# Patient Record
Sex: MALE | Race: WHITE | Employment: UNEMPLOYED | URBAN - METROPOLITAN AREA
[De-identification: names, ages, dates, MRNs, and addresses within clinical notes are randomized per-mention and may not be internally consistent; named-entity substitution may affect disease eponyms.]

---

## 2020-07-16 ENCOUNTER — APPOINTMENT (OUTPATIENT)
Dept: CT IMAGING | Age: 25
End: 2020-07-16
Attending: EMERGENCY MEDICINE
Payer: COMMERCIAL

## 2020-07-16 ENCOUNTER — HOSPITAL ENCOUNTER (EMERGENCY)
Age: 25
Discharge: HOME | End: 2020-07-16
Attending: EMERGENCY MEDICINE
Payer: COMMERCIAL

## 2020-07-16 ENCOUNTER — APPOINTMENT (OUTPATIENT)
Dept: GENERAL RADIOLOGY | Age: 25
End: 2020-07-16
Attending: EMERGENCY MEDICINE
Payer: COMMERCIAL

## 2020-07-16 VITALS
HEIGHT: 73 IN | WEIGHT: 220 LBS | DIASTOLIC BLOOD PRESSURE: 70 MMHG | BODY MASS INDEX: 29.16 KG/M2 | RESPIRATION RATE: 18 BRPM | TEMPERATURE: 97.9 F | HEART RATE: 86 BPM | SYSTOLIC BLOOD PRESSURE: 128 MMHG | OXYGEN SATURATION: 97 %

## 2020-07-16 DIAGNOSIS — Z72.89 ENGAGES IN VAPING: ICD-10-CM

## 2020-07-16 DIAGNOSIS — R55 SYNCOPE AND COLLAPSE: Primary | ICD-10-CM

## 2020-07-16 LAB
ALBUMIN SERPL-MCNC: 4.2 G/DL (ref 3.5–5)
ALBUMIN/GLOB SERPL: 1.4 {RATIO} (ref 1.1–2.2)
ALP SERPL-CCNC: 60 U/L (ref 45–117)
ALT SERPL-CCNC: 35 U/L (ref 12–78)
AMPHET UR QL SCN: POSITIVE
ANION GAP SERPL CALC-SCNC: 9 MMOL/L (ref 5–15)
APPEARANCE UR: CLEAR
AST SERPL-CCNC: 33 U/L (ref 15–37)
BARBITURATES UR QL SCN: NEGATIVE
BASOPHILS # BLD: 0 K/UL (ref 0–0.1)
BASOPHILS NFR BLD: 1 % (ref 0–1)
BENZODIAZ UR QL: NEGATIVE
BILIRUB SERPL-MCNC: 0.3 MG/DL (ref 0.2–1)
BILIRUB UR QL: NEGATIVE
BUN SERPL-MCNC: 19 MG/DL (ref 6–20)
BUN/CREAT SERPL: 15 (ref 12–20)
CALCIUM SERPL-MCNC: 8.7 MG/DL (ref 8.5–10.1)
CANNABINOIDS UR QL SCN: NEGATIVE
CHLORIDE SERPL-SCNC: 104 MMOL/L (ref 97–108)
CK SERPL-CCNC: 645 U/L (ref 39–308)
CO2 SERPL-SCNC: 27 MMOL/L (ref 21–32)
COCAINE UR QL SCN: NEGATIVE
COLOR UR: NORMAL
CREAT SERPL-MCNC: 1.28 MG/DL (ref 0.7–1.3)
DIFFERENTIAL METHOD BLD: NORMAL
DRUG SCRN COMMENT,DRGCM: ABNORMAL
EOSINOPHIL # BLD: 0.1 K/UL (ref 0–0.4)
EOSINOPHIL NFR BLD: 1 % (ref 0–7)
ERYTHROCYTE [DISTWIDTH] IN BLOOD BY AUTOMATED COUNT: 12.1 % (ref 11.5–14.5)
ETHANOL SERPL-MCNC: <10 MG/DL
GLOBULIN SER CALC-MCNC: 3.1 G/DL (ref 2–4)
GLUCOSE SERPL-MCNC: 91 MG/DL (ref 65–100)
GLUCOSE UR STRIP.AUTO-MCNC: NEGATIVE MG/DL
HCT VFR BLD AUTO: 42.8 % (ref 36.6–50.3)
HGB BLD-MCNC: 14 G/DL (ref 12.1–17)
HGB UR QL STRIP: NEGATIVE
IMM GRANULOCYTES # BLD AUTO: 0 K/UL (ref 0–0.04)
IMM GRANULOCYTES NFR BLD AUTO: 0 % (ref 0–0.5)
KETONES UR QL STRIP.AUTO: NEGATIVE MG/DL
LACTATE SERPL-SCNC: 0.7 MMOL/L (ref 0.4–2)
LEUKOCYTE ESTERASE UR QL STRIP.AUTO: NEGATIVE
LIPASE SERPL-CCNC: 73 U/L (ref 73–393)
LYMPHOCYTES # BLD: 3.5 K/UL (ref 0.8–3.5)
LYMPHOCYTES NFR BLD: 44 % (ref 12–49)
MCH RBC QN AUTO: 29.6 PG (ref 26–34)
MCHC RBC AUTO-ENTMCNC: 32.7 G/DL (ref 30–36.5)
MCV RBC AUTO: 90.5 FL (ref 80–99)
METHADONE UR QL: NEGATIVE
MONOCYTES # BLD: 0.6 K/UL (ref 0–1)
MONOCYTES NFR BLD: 7 % (ref 5–13)
NEUTS SEG # BLD: 3.8 K/UL (ref 1.8–8)
NEUTS SEG NFR BLD: 47 % (ref 32–75)
NITRITE UR QL STRIP.AUTO: NEGATIVE
NRBC # BLD: 0 K/UL (ref 0–0.01)
NRBC BLD-RTO: 0 PER 100 WBC
OPIATES UR QL: NEGATIVE
PCP UR QL: NEGATIVE
PH UR STRIP: 6.5 [PH] (ref 5–8)
PLATELET # BLD AUTO: 238 K/UL (ref 150–400)
PMV BLD AUTO: 10.7 FL (ref 8.9–12.9)
POTASSIUM SERPL-SCNC: 4.1 MMOL/L (ref 3.5–5.1)
PROT SERPL-MCNC: 7.3 G/DL (ref 6.4–8.2)
PROT UR STRIP-MCNC: NEGATIVE MG/DL
RBC # BLD AUTO: 4.73 M/UL (ref 4.1–5.7)
SODIUM SERPL-SCNC: 140 MMOL/L (ref 136–145)
SP GR UR REFRACTOMETRY: 1.02 (ref 1–1.03)
TROPONIN I SERPL-MCNC: <0.05 NG/ML
UROBILINOGEN UR QL STRIP.AUTO: 0.2 EU/DL (ref 0.2–1)
WBC # BLD AUTO: 8 K/UL (ref 4.1–11.1)

## 2020-07-16 PROCEDURE — 83605 ASSAY OF LACTIC ACID: CPT

## 2020-07-16 PROCEDURE — 93005 ELECTROCARDIOGRAM TRACING: CPT

## 2020-07-16 PROCEDURE — 70450 CT HEAD/BRAIN W/O DYE: CPT

## 2020-07-16 PROCEDURE — 80307 DRUG TEST PRSMV CHEM ANLYZR: CPT

## 2020-07-16 PROCEDURE — 84484 ASSAY OF TROPONIN QUANT: CPT

## 2020-07-16 PROCEDURE — 99285 EMERGENCY DEPT VISIT HI MDM: CPT

## 2020-07-16 PROCEDURE — 85025 COMPLETE CBC W/AUTO DIFF WBC: CPT

## 2020-07-16 PROCEDURE — 36415 COLL VENOUS BLD VENIPUNCTURE: CPT

## 2020-07-16 PROCEDURE — 80053 COMPREHEN METABOLIC PANEL: CPT

## 2020-07-16 PROCEDURE — 83690 ASSAY OF LIPASE: CPT

## 2020-07-16 PROCEDURE — 71046 X-RAY EXAM CHEST 2 VIEWS: CPT

## 2020-07-16 PROCEDURE — 81003 URINALYSIS AUTO W/O SCOPE: CPT

## 2020-07-16 PROCEDURE — 82550 ASSAY OF CK (CPK): CPT

## 2020-07-16 RX ORDER — DEXTROAMPHETAMINE SACCHARATE, AMPHETAMINE ASPARTATE, DEXTROAMPHETAMINE SULFATE AND AMPHETAMINE SULFATE 5; 5; 5; 5 MG/1; MG/1; MG/1; MG/1
20 TABLET ORAL 2 TIMES DAILY
COMMUNITY

## 2020-07-16 NOTE — ED NOTES
The patient was discharged home by Dr. Regis Bennett and Norma Hodge rnin stable condition, accompanied by mother. The patient is alert and oriented, is in no respiratory distress and has vital signs within normal limits . The patient's diagnosis, condition and treatment were explained to patient. The patient expressed understanding. No prescriptions given to pt. No work/school note given to pt. A discharge plan has been developed. A  was not involved in the process. Aftercare instructions were given to the patient. pt's saline lock removed without complications. Mother will transport pt home.

## 2020-07-16 NOTE — DISCHARGE INSTRUCTIONS
Patient Education     Fainting: After Your Visit to the Emergency Room  Your Care Instructions  You were seen in the emergency room because you fainted. This means you passed out, or lost consciousness. The treatment you need depends on the reason why you fainted. In many cases fainting is not serious. The doctor may have ordered tests to rule out more serious causes of fainting. Even though you have been released from the emergency room, you still need to watch for any problems. The doctor carefully checked you. But sometimes problems can develop later. If you have new symptoms, or if your symptoms do not get better, return to the emergency room or call your doctor right away. A visit to the emergency room is only one step in your treatment. Even if you feel better, you still need to do what your doctor recommends, such as going to all suggested follow-up appointments and taking medicines exactly as directed. This will help you recover and help prevent future problems. How can you care for yourself at home? · Drink plenty of fluids to prevent dehydration. If you have kidney, heart, or liver disease and have to limit fluids, talk with your doctor before you increase your fluid intake. When should you call for help? Call 911 if:  · You have chest pain or pressure. This may occur with:  ¨ Sweating. ¨ Shortness of breath. ¨ Nausea or vomiting. ¨ Pain that spreads from the chest to the neck, jaw, or one or both shoulders or arms. ¨ Dizziness or lightheadedness. ¨ A fast or uneven pulse. After calling 911, chew 1 adult-strength aspirin. Wait for an ambulance. Do not try to drive yourself. · You have signs of a stroke. These may include:  ¨ Sudden numbness, paralysis, or weakness in your face, arm, or leg, especially on only one side of your body. ¨ New problems with walking or balance. ¨ Sudden vision changes. ¨ Drooling or slurred speech.   ¨ New problems speaking or understanding simple statements, or feeling confused. ¨ A sudden, severe headache that is different from past headaches. Return to the emergency room now if:  · You passed out (lost consciousness) again. · You have a seizure. Where can you learn more? Go to Travel Notes.be  Enter J227 in the search box to learn more about \"Fainting: After Your Visit to the Emergency Room. \"   © 9490-1554 Healthwise, Incorporated. Care instructions adapted under license by [facility] (which disclaims liability or warranty for this information). This care instruction is for use with your licensed healthcare professional. If you have questions about a medical condition or this instruction, always ask your healthcare professional. [place] [address] any warranty or liability for your use of this information. Content Version: 9.4.83988; Last Revised: September 21, 2010             Patient Education        Learning About Vaping  What is it? Vaping is using a battery-powered device to inhale liquid nicotine or other substances. The devices can look like everyday items such as pens, cigarettes, or [employer] flash drives. Instead of tobacco, they use a blend of liquid nicotine, flavors, and other chemicals. This is called vaping liquid. It comes in different nicotine strengths. THC (a chemical in marijuana) products can also be used. Vapes are also called:  · E-cigarettes. · Vape pens. · Juuls. How do vapes work? Vapes make an aerosol cloud by heating vaping liquid or THC. You breathe in through the mouthpiece. This turns on the battery, which blair the heating element. This turns vaping liquid or THC into tiny particles suspended in gas, called an aerosol. What are the safety concerns? More research is needed before experts can tell if vaping is safe. The long-term health effects aren't known. Here's what experts are learning about vaping. It's not harmless water vapor. The \"vapor\" made by vaping isn't a vapor at all.  It's an aerosol cloud made of chemicals suspended in a gas. This vape aerosol contains chemicals known to be harmful. It likely has nicotine. Nicotine is addictive. It's harmful to developing brains, such as in unborn babies, children, and young adults up to age 22. Liquid nicotine can be lethal if swallowed. Keep it out of children's reach. It may cause a deadly lung disease. There have been outbreaks of lung disease and death related to vaping. Some sources call it vaping-related lung injury. Many of these may be from vaping products with THC. But the exact cause isn't known. How well does it work to help people stop smoking? More research is needed to know how well vaping works to help people stop smoking. Some people try to quit smoking by reducing the amount of nicotine they vape. They do this slowly over time. If you're thinking of using vaping to help you stop smoking, talk to your doctor first. Here are some other things to think about. It's not approved as a quit-smoking aid. The U.S. Food and Drug Administration and the Centers for Disease Control and Prevention don't recommend vaping to help quit smoking. Nicotine replacement patches or gums are preferred. Many people end up using both. This is called dual use. Some people choose to vape when they can't smoke. But they still smoke cigarettes. Where can you learn more? Go to http://breanne-sondra.info/  Enter C351 in the search box to learn more about \"Learning About Vaping. \"  Current as of: March 12, 2020               Content Version: 12.5  © 1192-7929 Healthwise, Incorporated. Care instructions adapted under license by [employer] (which disclaims liability or warranty for this information). If you have questions about a medical condition or this instruction, always ask your healthcare professional. [address] any warranty or liability for your use of this information.

## 2020-07-16 NOTE — ED TRIAGE NOTES
Pt states that he had a seizure after \"jeweling'. Pt states that his leg was jerking upon awakening after his spell.

## 2020-07-16 NOTE — ED PROVIDER NOTES
Please note that this dictation was completed with Umeng, the computer voice recognition software.  Quite often unanticipated grammatical, syntax, homophones, and other interpretive errors are inadvertently transcribed by the computer software.  Please disregard these errors.  Please excuse any errors that have escaped final proofreading. 71-year-old white male past medical history markable for ADHD on Adderall and who is a regular vapor presents the ER with his mother complaining of \"I think I had a seizure approximately 430 this morning (approximately 1 hour ago). Patient states he was up this evening knew he was not can be able to sleep as he is not been able to sleep eating well times the past 1.5 months, so he took a dose of Adderall at approximately 10 PM.  Patient states he said that he was then up in the house using his [address], and took a vape and stood up. He said next thing he knows he woke up in the floor after an unknown period of unconsciousness though he does not think it was long and noticed he was shaking. He says he banged his right arm. He did not lose bowel or bladder continence did not vomit did not bite his tongue. He said he sat there for a minute get his weight back about him that his mother who brought him to the ER. Patient is currently alert and oriented x4 ambulating with ease no issues with vision changes numbness tingling problems with ambulation. He said he does still feel \"a little bit foggy. \"  Patient denies recent alcohol drug usage dose he does admit that last Thursday he went to a friend's house and they had some alcohol. He states he has been eating and drinking normally no recent energy drink usage or no family history of seizure disorders. No previous episodes of seizures, no over-the-counter supplements no recent medication adjustments.     pt denies HA, vison changes, diff swallowing, CP, SOB, Abd pain, F/Ch, N/V, D/Cons or other current systemic complaints    Social/ PSH reviewed in EMR    EMR Chart Reviewed           Past Medical History:   Diagnosis Date    Neurological disorder     ADHD       No past surgical history on file. No family history on file. Social History     Socioeconomic History    Marital status: SINGLE     Spouse name: Not on file    Number of children: Not on file    Years of education: Not on file    Highest education level: Not on file   Occupational History    Not on file   Social Needs    Financial resource strain: Not on file    Food insecurity     Worry: Not on file     Inability: Not on file    Transportation needs     Medical: Not on file     Non-medical: Not on file   Tobacco Use    Smoking status: Current Some Day Smoker   Substance and Sexual Activity    Alcohol use: Yes    Drug use: Never    Sexual activity: Not on file   Lifestyle    Physical activity     Days per week: Not on file     Minutes per session: Not on file    Stress: Not on file   Relationships    Social connections     Talks on phone: Not on file     Gets together: Not on file     Attends Synagogue service: Not on file     Active member of club or organization: Not on file     Attends meetings of clubs or organizations: Not on file     Relationship status: Not on file    Intimate partner violence     Fear of current or ex partner: Not on file     Emotionally abused: Not on file     Physically abused: Not on file     Forced sexual activity: Not on file   Other Topics Concern    Not on file   Social History Narrative    Not on file         ALLERGIES: Patient has no known allergies. Review of Systems   Constitutional: Negative for appetite change, chills, diaphoresis and fever. HENT: Negative for drooling, trouble swallowing and voice change. Eyes: Negative for visual disturbance. Respiratory: Negative for cough, chest tightness and shortness of breath. Cardiovascular: Negative for chest pain, palpitations and leg swelling. Gastrointestinal: Negative for abdominal pain, diarrhea, nausea and vomiting. Genitourinary: Negative for dysuria. Musculoskeletal: Negative for back pain. Skin: Negative for rash. Neurological: Positive for seizures and syncope. Psychiatric/Behavioral: Positive for sleep disturbance. All other systems reviewed and are negative. Vitals:    07/16/20 0523   BP: 124/61   Pulse: 86   Resp: 18   Temp: 97.9 °F (36.6 °C)   SpO2: 99%   Weight: 99.8 kg (220 lb)   Height: 6' 1\" (1.854 m)            Physical Exam  Vitals signs and nursing note reviewed. Constitutional:       General: He is not in acute distress. Appearance: Normal appearance. He is well-developed. He is not ill-appearing, toxic-appearing or diaphoretic. Comments: NAD, AxOx4, speaking in complete sentences    Abrasion to L forearm;     gcs = 15   HENT:      Head: Normocephalic and atraumatic. Comments: Cn intact    No loose/ broken teeth, bite alignment wnl; no tongue hematoma/ blood in mouth/ nose    No septal hematoma/ hemotympanum or mastoid tenderness noted     Right Ear: External ear normal.      Left Ear: External ear normal.      Mouth/Throat:      Pharynx: No oropharyngeal exudate or posterior oropharyngeal erythema. Eyes:      General: No scleral icterus. Right eye: No discharge. Left eye: No discharge. Extraocular Movements: Extraocular movements intact. Conjunctiva/sclera: Conjunctivae normal.      Pupils: Pupils are equal, round, and reactive to light. Neck:      Musculoskeletal: Normal range of motion and neck supple. Cardiovascular:      Rate and Rhythm: Normal rate and regular rhythm. Pulses: Normal pulses. Heart sounds: Normal heart sounds. No murmur. No friction rub. No gallop. Pulmonary:      Effort: Pulmonary effort is normal. No respiratory distress. Breath sounds: Normal breath sounds. No stridor. No wheezing, rhonchi or rales.    Chest:      Chest wall: No tenderness. Abdominal:      General: Bowel sounds are normal. There is no distension. Palpations: Abdomen is soft. There is no mass. Tenderness: There is no abdominal tenderness. There is no guarding or rebound. Hernia: No hernia is present. Genitourinary:     Comments: Pt denies urinary/ Testicular/ scrotal or penile  complaints  Musculoskeletal: Normal range of motion. General: Signs of injury present. No swelling, tenderness or deformity. Right lower leg: No edema. Left lower leg: No edema. Comments: L forearm min ttp/ abrasion noted; Pt had central/ paraspinal C/T/L spines, Upper/Lower ext long bones, Abdomen,  Pelvis, hands /feet and all joints palpated and tolerated well (except as above) ; Pt has motor/ CV / Sensation grossly intact to all extremities; Lymphadenopathy:      Cervical: No cervical adenopathy. Skin:     General: Skin is warm and dry. Capillary Refill: Capillary refill takes less than 2 seconds. Coloration: Skin is not jaundiced or pale. Findings: Bruising present. No erythema or rash. Neurological:      General: No focal deficit present. Mental Status: He is alert and oriented to person, place, and time. Cranial Nerves: No cranial nerve deficit. Sensory: No sensory deficit. Motor: No weakness. Coordination: Coordination normal.      Gait: Gait normal.      Deep Tendon Reflexes: Reflexes normal.      Comments: pt has motor/ CV/ Sensation grossly intact to all extremities, R = L in strength;          MDM       Procedures    Chief Complaint   Patient presents with    Seizure       5:42 AM  The patients presenting problems have been discussed, and they are in agreement with the care plan formulated and outlined with them. I have encouraged them to ask questions as they arise throughout their visit.     MEDICATIONS GIVEN:  Medications - No data to display    LABS REVIEWED:  Labs Reviewed   LIPASE   URINALYSIS W/ RFLX MICROSCOPIC   DRUG SCREEN, URINE   METABOLIC PANEL, COMPREHENSIVE   CBC WITH AUTOMATED DIFF   ETHYL ALCOHOL   CK   LACTIC ACID       RADIOLOGY RESULTS:  The following have been ordered and reviewed:  _____________________________________________________________________  _____________________________________________________________________    EKG interpretation:   Rhythm: normal sinus rhythm; and regular . Rate (approx.): 66; Axis: normal; P wave: normal; QRS interval: normal ; ST/T wave: normal; Negative acute significant segmental elevations/ no old study;     PROCEDURES:        CONSULTATIONS:       PROGRESS NOTES:      DIAGNOSIS:    1. Syncope and collapse    2. Engages in Uus 6:  1- syncope >>> seizure; neurology follow-up as discussed w/ Pt/ mom;   2 stop adderall/ vaping      ED COURSE: The patients hospital course has been uncomplicated. 6:42 AM  Discussed labs/ presumed syncope >> seizure; needs to quit vaping; Pt agrees; discussed [facility] as a source for resources regarding improved mental hygiene; awaiting cxr/ head ct results;        6:50 AM seizure/ syncope precautions given;   Annel Gamez's  results have been reviewed with him. He has been counseled regarding his diagnosis. He verbally conveys understanding and agreement of the signs, symptoms, diagnosis, treatment and prognosis and additionally agrees to Call/ Arrange follow up as recommended with Dr. Pascale Fierro MD in 24 - 48 hours. He also agrees with the care-plan and conveys that all of his questions have been answered. I have also put together some discharge instructions for him that include: 1) educational information regarding their diagnosis, 2) how to care for their diagnosis at home, as well a 3) list of reasons why they would want to return to the ED prior to their follow-up appointment, should their condition change or for concerns.

## 2020-07-17 LAB
ATRIAL RATE: 67 BPM
CALCULATED P AXIS, ECG09: 60 DEGREES
CALCULATED R AXIS, ECG10: 54 DEGREES
CALCULATED T AXIS, ECG11: 30 DEGREES
DIAGNOSIS, 93000: NORMAL
P-R INTERVAL, ECG05: 152 MS
Q-T INTERVAL, ECG07: 362 MS
QRS DURATION, ECG06: 98 MS
QTC CALCULATION (BEZET), ECG08: 382 MS
VENTRICULAR RATE, ECG03: 67 BPM

## 2023-05-12 RX ORDER — DEXTROAMPHETAMINE SACCHARATE, AMPHETAMINE ASPARTATE, DEXTROAMPHETAMINE SULFATE AND AMPHETAMINE SULFATE 5; 5; 5; 5 MG/1; MG/1; MG/1; MG/1
TABLET ORAL 2 TIMES DAILY
COMMUNITY